# Patient Record
Sex: FEMALE | Race: ASIAN | NOT HISPANIC OR LATINO
[De-identification: names, ages, dates, MRNs, and addresses within clinical notes are randomized per-mention and may not be internally consistent; named-entity substitution may affect disease eponyms.]

---

## 2017-11-13 ENCOUNTER — APPOINTMENT (OUTPATIENT)
Dept: OBGYN | Facility: CLINIC | Age: 32
End: 2017-11-13
Payer: COMMERCIAL

## 2017-11-13 VITALS
SYSTOLIC BLOOD PRESSURE: 130 MMHG | DIASTOLIC BLOOD PRESSURE: 80 MMHG | HEIGHT: 64 IN | BODY MASS INDEX: 26.12 KG/M2 | HEART RATE: 80 BPM | WEIGHT: 153 LBS

## 2017-11-13 PROCEDURE — 99395 PREV VISIT EST AGE 18-39: CPT

## 2018-02-20 DIAGNOSIS — L30.9 DERMATITIS, UNSPECIFIED: ICD-10-CM

## 2018-11-15 ENCOUNTER — APPOINTMENT (OUTPATIENT)
Dept: OBGYN | Facility: CLINIC | Age: 33
End: 2018-11-15
Payer: COMMERCIAL

## 2018-11-15 VITALS
HEART RATE: 91 BPM | HEIGHT: 64 IN | BODY MASS INDEX: 27.74 KG/M2 | WEIGHT: 162.5 LBS | DIASTOLIC BLOOD PRESSURE: 87 MMHG | SYSTOLIC BLOOD PRESSURE: 136 MMHG

## 2018-11-15 PROCEDURE — 99395 PREV VISIT EST AGE 18-39: CPT

## 2018-11-15 RX ORDER — CLOTRIMAZOLE AND BETAMETHASONE DIPROPIONATE 10; .5 MG/G; MG/G
1-0.05 CREAM TOPICAL TWICE DAILY
Qty: 30 | Refills: 2 | Status: DISCONTINUED | COMMUNITY
Start: 2018-02-20 | End: 2018-11-15

## 2019-07-22 ENCOUNTER — APPOINTMENT (OUTPATIENT)
Dept: OBGYN | Facility: CLINIC | Age: 34
End: 2019-07-22
Payer: COMMERCIAL

## 2019-07-22 VITALS
DIASTOLIC BLOOD PRESSURE: 78 MMHG | SYSTOLIC BLOOD PRESSURE: 124 MMHG | WEIGHT: 156 LBS | BODY MASS INDEX: 26.63 KG/M2 | HEIGHT: 64 IN | HEART RATE: 71 BPM

## 2019-07-22 DIAGNOSIS — Z01.419 ENCOUNTER FOR GYNECOLOGICAL EXAMINATION (GENERAL) (ROUTINE) W/OUT ABNORMAL FINDINGS: ICD-10-CM

## 2019-07-22 PROCEDURE — 99395 PREV VISIT EST AGE 18-39: CPT

## 2020-05-07 ENCOUNTER — APPOINTMENT (OUTPATIENT)
Dept: OBGYN | Facility: CLINIC | Age: 35
End: 2020-05-07

## 2020-10-15 ENCOUNTER — APPOINTMENT (OUTPATIENT)
Dept: OBGYN | Facility: CLINIC | Age: 35
End: 2020-10-15
Payer: COMMERCIAL

## 2020-10-15 VITALS
WEIGHT: 157 LBS | HEIGHT: 64 IN | DIASTOLIC BLOOD PRESSURE: 71 MMHG | SYSTOLIC BLOOD PRESSURE: 102 MMHG | BODY MASS INDEX: 26.8 KG/M2 | HEART RATE: 71 BPM

## 2020-10-15 DIAGNOSIS — Z09 ENCOUNTER FOR FOLLOW-UP EXAMINATION AFTER COMPLETED TREATMENT FOR CONDITIONS OTHER THAN MALIGNANT NEOPLASM: ICD-10-CM

## 2020-10-15 DIAGNOSIS — Z64.0 PROBLEMS RELATED TO UNWANTED PREGNANCY: ICD-10-CM

## 2020-10-15 DIAGNOSIS — Z12.4 ENCOUNTER FOR SCREENING FOR MALIGNANT NEOPLASM OF CERVIX: ICD-10-CM

## 2020-10-15 PROCEDURE — 99213 OFFICE O/P EST LOW 20 MIN: CPT

## 2020-10-15 NOTE — REVIEW OF SYSTEMS
[Chills] : no chills [Fever] : no fever [Poor Appetite] : appetite not poor [Night Sweats] : no night sweats [Fatigue] : no fatigue [Dry Eyes] : no dry eyes [Eye Discharge] : no eye discharge [Sight Problems] : no sight problems [Eye Redness] : no eye redness [Nasal Discharge] : no nasal discharge [Dec Hearing] : no decreased hearing [Sore Throat] : no sore throat [Cough] : no cough [Dyspnea] : no dyspnea [SOB on Exertion] : no shortness of breath on exertion [Chest Pain] : no chest pain [Wheezing] : no wheezing [Palpitations] : no palpitations [Lower Ext Edema] : no lower extremity edema [Abdominal Pain] : no abdominal pain [Orthopnea] : no orthopnea [Diarrhea] : diarrhea [Constipation] : no constipation [Heartburn] : no heartburn [Vomiting] : no vomiting [Melena] : no melena [Bloating] : no bloating [Nausea] : no nausea [Urgency] : no urgency [Frequency] : no frequency [Incontinence] : no incontinence [Dysuria] : no dysuria [Abn Vaginal bleeding] : no abnormal vaginal bleeding [Urethral Discharge] : no urethral discharge [CVA Pain] : no CVA pain [Pelvic pain] : no pelvic pain [Genital Rash/Irritation] : no genital rash/irritation [Breast Pain] : no breast pain [Nipple Discharge] : no nipple discharge [Breast Lump] : no breast lump [Nipple Changes] : no nipple changes [Skin Lesion on Breast] : no skin lesion on breast [Skin Rash] : no skin rash [Arthralgias] : no arthralgias [Mole Changes] : no mole changes [Joint Stiffness] : no joint stiffness [Myalgias] : no myalgias [Joint Swelling] : no joint swelling [Back Pain] : no back pain [Fainting] : no fainting [Dizziness] : no dizziness [Headache] : no headache [Depression] : no depression [Convulsions] : no convulsions [Anxiety] : no anxiety [Sleep Disturbances] : no sleep disturbances [Decreased Libido] : libido not decreased [PMS/PMDD Symptoms] : no PMS/PMDD symptoms [Deepening Voice] : no deepening voice [Feeling Weak] : not feeling weak [Muscle Weakness] : no muscle weakness [Hot Flashes] : no hot flashes [Easy Bleeding] : no easy bleeding [Swollen Glands] : no swollen glands [Easy Bruising] : no easy bruising [Nose Bleeds] : no nose bleeds [History of Anemia] : no history of anemia

## 2020-10-17 LAB
CANDIDA VAG CYTO: DETECTED
G VAGINALIS+PREV SP MTYP VAG QL MICRO: NOT DETECTED
HPV HIGH+LOW RISK DNA PNL CVX: NOT DETECTED
T VAGINALIS VAG QL WET PREP: NOT DETECTED

## 2020-10-19 LAB — CYTOLOGY CVX/VAG DOC THIN PREP: ABNORMAL

## 2021-10-20 ENCOUNTER — APPOINTMENT (OUTPATIENT)
Dept: OBGYN | Facility: CLINIC | Age: 36
End: 2021-10-20

## 2021-12-16 ENCOUNTER — APPOINTMENT (OUTPATIENT)
Dept: OBGYN | Facility: CLINIC | Age: 36
End: 2021-12-16
Payer: COMMERCIAL

## 2021-12-16 VITALS
BODY MASS INDEX: 26.29 KG/M2 | HEIGHT: 64 IN | SYSTOLIC BLOOD PRESSURE: 128 MMHG | DIASTOLIC BLOOD PRESSURE: 78 MMHG | WEIGHT: 154 LBS | HEART RATE: 79 BPM

## 2021-12-16 DIAGNOSIS — Z86.79 PERSONAL HISTORY OF OTHER DISEASES OF THE CIRCULATORY SYSTEM: ICD-10-CM

## 2021-12-16 DIAGNOSIS — Z87.898 PERSONAL HISTORY OF OTHER SPECIFIED CONDITIONS: ICD-10-CM

## 2021-12-16 DIAGNOSIS — Z87.42 PERSONAL HISTORY OF OTHER DISEASES OF THE FEMALE GENITAL TRACT: ICD-10-CM

## 2021-12-16 DIAGNOSIS — Z81.8 FAMILY HISTORY OF OTHER MENTAL AND BEHAVIORAL DISORDERS: ICD-10-CM

## 2021-12-16 PROCEDURE — 99395 PREV VISIT EST AGE 18-39: CPT

## 2021-12-16 RX ORDER — FLUCONAZOLE 150 MG/1
150 TABLET ORAL
Qty: 1 | Refills: 0 | Status: DISCONTINUED | COMMUNITY
Start: 2020-10-16 | End: 2021-12-16

## 2021-12-16 RX ORDER — CHROMIUM 200 MCG
1000 TABLET ORAL
Refills: 0 | Status: ACTIVE | COMMUNITY

## 2021-12-16 NOTE — PHYSICAL EXAM
[Appropriately responsive] : appropriately responsive [Alert] : alert [No Acute Distress] : no acute distress [Soft] : soft [Non-tender] : non-tender [No Lesions] : no lesions [Oriented x3] : oriented x3 [Examination Of The Breasts] : a normal appearance [No Masses] : no breast masses were palpable [Labia Majora] : normal [Labia Minora] : normal [No Bleeding] : There was no active vaginal bleeding [Normal] : normal [Tenderness] : nontender [Uterine Adnexae] : non-palpable

## 2022-05-10 NOTE — REVIEW OF SYSTEMS
5/10/2022  Occupational Therapy    Chart reviewed in preparation for therapy. Spoke with nursing and pt not appropriate for therapy. Will continue to follow.      Thank you,   Terence Duran OTR/L [Nl] : Integumentary

## 2022-12-20 ENCOUNTER — APPOINTMENT (OUTPATIENT)
Dept: OBGYN | Facility: CLINIC | Age: 37
End: 2022-12-20

## 2022-12-20 VITALS
HEART RATE: 76 BPM | DIASTOLIC BLOOD PRESSURE: 76 MMHG | WEIGHT: 159.4 LBS | BODY MASS INDEX: 27.21 KG/M2 | HEIGHT: 64 IN | SYSTOLIC BLOOD PRESSURE: 110 MMHG

## 2022-12-20 PROCEDURE — 99395 PREV VISIT EST AGE 18-39: CPT

## 2023-12-27 ENCOUNTER — APPOINTMENT (OUTPATIENT)
Dept: OBGYN | Facility: CLINIC | Age: 38
End: 2023-12-27
Payer: COMMERCIAL

## 2023-12-27 VITALS
SYSTOLIC BLOOD PRESSURE: 114 MMHG | WEIGHT: 158 LBS | BODY MASS INDEX: 26.98 KG/M2 | HEIGHT: 64 IN | DIASTOLIC BLOOD PRESSURE: 79 MMHG | HEART RATE: 74 BPM

## 2023-12-27 DIAGNOSIS — Z01.419 ENCOUNTER FOR GYNECOLOGICAL EXAMINATION (GENERAL) (ROUTINE) W/OUT ABNORMAL FINDINGS: ICD-10-CM

## 2023-12-27 PROCEDURE — 99395 PREV VISIT EST AGE 18-39: CPT

## 2023-12-28 LAB — HPV HIGH+LOW RISK DNA PNL CVX: NOT DETECTED

## 2023-12-28 NOTE — PLAN
[FreeTextEntry1] : Screening breast imaging orders to start next year RTO prn or for annual
Unable to assess due to medical condition

## 2023-12-28 NOTE — HISTORY OF PRESENT ILLNESS
[FreeTextEntry1] : This 38 year old P2  LMP 12/14/23, presents for annual. Menstrual cycles appear about q  25 days lasting 3-4 days, using condoms with , denies vaginal irritation or change in discharge, voiding and stooling without issue, no change to her medical/surgical or family hx. [PapSmeardate] : Oct 2020 [TextBox_31] : neg pap and HPV

## 2024-01-02 LAB — CYTOLOGY CVX/VAG DOC THIN PREP: NORMAL

## 2024-12-30 ENCOUNTER — APPOINTMENT (OUTPATIENT)
Dept: OBGYN | Facility: CLINIC | Age: 39
End: 2024-12-30
Payer: COMMERCIAL

## 2024-12-30 VITALS
HEIGHT: 64 IN | SYSTOLIC BLOOD PRESSURE: 117 MMHG | WEIGHT: 160 LBS | HEART RATE: 62 BPM | BODY MASS INDEX: 27.31 KG/M2 | DIASTOLIC BLOOD PRESSURE: 76 MMHG

## 2024-12-30 DIAGNOSIS — Z01.419 ENCOUNTER FOR GYNECOLOGICAL EXAMINATION (GENERAL) (ROUTINE) W/OUT ABNORMAL FINDINGS: ICD-10-CM

## 2024-12-30 DIAGNOSIS — Z12.4 ENCOUNTER FOR SCREENING FOR MALIGNANT NEOPLASM OF CERVIX: ICD-10-CM

## 2024-12-30 DIAGNOSIS — N92.6 IRREGULAR MENSTRUATION, UNSPECIFIED: ICD-10-CM

## 2024-12-30 PROCEDURE — 99213 OFFICE O/P EST LOW 20 MIN: CPT | Mod: 25

## 2024-12-30 PROCEDURE — 99459 PELVIC EXAMINATION: CPT

## 2024-12-30 PROCEDURE — 99396 PREV VISIT EST AGE 40-64: CPT

## 2025-01-02 LAB
HCG SERPL-MCNC: <1 MIU/ML
PROLACTIN SERPL-MCNC: 12.9 NG/ML
TSH SERPL-ACNC: 1.09 UIU/ML

## 2025-01-25 ENCOUNTER — APPOINTMENT (OUTPATIENT)
Dept: MAMMOGRAPHY | Facility: CLINIC | Age: 40
End: 2025-01-25
Payer: COMMERCIAL

## 2025-01-25 ENCOUNTER — RESULT REVIEW (OUTPATIENT)
Age: 40
End: 2025-01-25

## 2025-01-25 ENCOUNTER — OUTPATIENT (OUTPATIENT)
Dept: OUTPATIENT SERVICES | Facility: HOSPITAL | Age: 40
LOS: 1 days | End: 2025-01-25
Payer: COMMERCIAL

## 2025-01-25 DIAGNOSIS — Z01.419 ENCOUNTER FOR GYNECOLOGICAL EXAMINATION (GENERAL) (ROUTINE) WITHOUT ABNORMAL FINDINGS: ICD-10-CM

## 2025-01-25 PROCEDURE — 77067 SCR MAMMO BI INCL CAD: CPT

## 2025-01-25 PROCEDURE — 77067 SCR MAMMO BI INCL CAD: CPT | Mod: 26

## 2025-01-25 PROCEDURE — 77063 BREAST TOMOSYNTHESIS BI: CPT

## 2025-01-25 PROCEDURE — 77063 BREAST TOMOSYNTHESIS BI: CPT | Mod: 26

## 2025-02-05 PROBLEM — N64.9 BREAST DISORDER: Status: ACTIVE | Noted: 2025-02-05

## 2025-04-24 ENCOUNTER — APPOINTMENT (OUTPATIENT)
Dept: MAMMOGRAPHY | Facility: CLINIC | Age: 40
End: 2025-04-24
Payer: COMMERCIAL

## 2025-04-24 ENCOUNTER — OUTPATIENT (OUTPATIENT)
Dept: OUTPATIENT SERVICES | Facility: HOSPITAL | Age: 40
LOS: 1 days | End: 2025-04-24
Payer: COMMERCIAL

## 2025-04-24 ENCOUNTER — RESULT REVIEW (OUTPATIENT)
Age: 40
End: 2025-04-24

## 2025-04-24 ENCOUNTER — APPOINTMENT (OUTPATIENT)
Dept: ULTRASOUND IMAGING | Facility: CLINIC | Age: 40
End: 2025-04-24
Payer: COMMERCIAL

## 2025-04-24 DIAGNOSIS — Z00.8 ENCOUNTER FOR OTHER GENERAL EXAMINATION: ICD-10-CM

## 2025-04-24 DIAGNOSIS — N64.9 DISORDER OF BREAST, UNSPECIFIED: ICD-10-CM

## 2025-04-24 PROCEDURE — G0279: CPT

## 2025-04-24 PROCEDURE — 76641 ULTRASOUND BREAST COMPLETE: CPT

## 2025-04-24 PROCEDURE — G0279: CPT | Mod: 26

## 2025-04-24 PROCEDURE — 76641 ULTRASOUND BREAST COMPLETE: CPT | Mod: 26,LT

## 2025-04-24 PROCEDURE — 77065 DX MAMMO INCL CAD UNI: CPT | Mod: 26,LT

## 2025-04-24 PROCEDURE — 77065 DX MAMMO INCL CAD UNI: CPT
